# Patient Record
Sex: FEMALE | Race: BLACK OR AFRICAN AMERICAN | NOT HISPANIC OR LATINO
[De-identification: names, ages, dates, MRNs, and addresses within clinical notes are randomized per-mention and may not be internally consistent; named-entity substitution may affect disease eponyms.]

---

## 2023-03-06 PROBLEM — Z00.00 ENCOUNTER FOR PREVENTIVE HEALTH EXAMINATION: Status: ACTIVE | Noted: 2023-03-06

## 2023-03-13 ENCOUNTER — APPOINTMENT (OUTPATIENT)
Dept: OBGYN | Facility: CLINIC | Age: 33
End: 2023-03-13
Payer: MEDICAID

## 2023-03-13 ENCOUNTER — APPOINTMENT (OUTPATIENT)
Dept: ANTEPARTUM | Facility: CLINIC | Age: 33
End: 2023-03-13
Payer: MEDICAID

## 2023-03-13 ENCOUNTER — ASOB RESULT (OUTPATIENT)
Age: 33
End: 2023-03-13

## 2023-03-13 DIAGNOSIS — Z13.1 ENCOUNTER FOR SCREENING FOR DIABETES MELLITUS: ICD-10-CM

## 2023-03-13 DIAGNOSIS — Z13.29 ENCOUNTER FOR SCREENING FOR OTHER SUSPECTED ENDOCRINE DISORDER: ICD-10-CM

## 2023-03-13 DIAGNOSIS — Z11.59 ENCOUNTER FOR SCREENING FOR OTHER VIRAL DISEASES: ICD-10-CM

## 2023-03-13 DIAGNOSIS — Z11.3 ENCOUNTER FOR SCREENING FOR INFECTIONS WITH A PREDOMINANTLY SEXUAL MODE OF TRANSMISSION: ICD-10-CM

## 2023-03-13 DIAGNOSIS — Z34.90 ENCOUNTER FOR SUPERVISION OF NORMAL PREGNANCY, UNSPECIFIED, UNSPECIFIED TRIMESTER: ICD-10-CM

## 2023-03-13 DIAGNOSIS — Z13.71 ENCOUNTER FOR NONPROCREATIVE SCREENING FOR GENETIC DISEASE CARRIER STATUS: ICD-10-CM

## 2023-03-13 DIAGNOSIS — Z11.1 ENCOUNTER FOR SCREENING FOR RESPIRATORY TUBERCULOSIS: ICD-10-CM

## 2023-03-13 DIAGNOSIS — Z13.0 ENCOUNTER FOR SCREENING FOR DISEASES OF THE BLOOD AND BLOOD-FORMING ORGANS AND CERTAIN DISORDERS INVOLVING THE IMMUNE MECHANISM: ICD-10-CM

## 2023-03-13 DIAGNOSIS — Z83.3 FAMILY HISTORY OF DIABETES MELLITUS: ICD-10-CM

## 2023-03-13 LAB
BILIRUB UR QL STRIP: NORMAL
GLUCOSE UR-MCNC: NORMAL
HCG UR QL: 0.2 EU/DL
HGB UR QL STRIP.AUTO: NORMAL
KETONES UR-MCNC: NORMAL
LEUKOCYTE ESTERASE UR QL STRIP: NORMAL
NITRITE UR QL STRIP: NORMAL
PH UR STRIP: 7
PROT UR STRIP-MCNC: NORMAL
SP GR UR STRIP: 1.01

## 2023-03-13 PROCEDURE — 76815 OB US LIMITED FETUS(S): CPT

## 2023-03-13 PROCEDURE — 0500F INITIAL PRENATAL CARE VISIT: CPT

## 2023-03-13 RX ORDER — PSEUDOEPHEDRINE HCL 30 MG
27-0.8 TABLET ORAL DAILY
Qty: 60 | Refills: 2 | Status: ACTIVE | COMMUNITY
Start: 2023-03-13 | End: 1900-01-01

## 2023-03-15 LAB
ABO + RH PNL BLD: NORMAL
BACTERIA UR CULT: NORMAL
BASOPHILS # BLD AUTO: 0.05 K/UL
BASOPHILS NFR BLD AUTO: 0.6 %
BLD GP AB SCN SERPL QL: NORMAL
C TRACH RRNA SPEC QL NAA+PROBE: NOT DETECTED
EOSINOPHIL # BLD AUTO: 0.04 K/UL
EOSINOPHIL NFR BLD AUTO: 0.5 %
ESTIMATED AVERAGE GLUCOSE: 85 MG/DL
HBA1C MFR BLD HPLC: 4.6 %
HBV SURFACE AG SER QL: NONREACTIVE
HCT VFR BLD CALC: 31.5 %
HCV RNA FLD QL NAA+PROBE: NORMAL
HCV RNA SPEC QL PROBE+SIG AMP: NOT DETECTED
HGB BLD-MCNC: 10.1 G/DL
HIV1+2 AB SPEC QL IA.RAPID: NONREACTIVE
IMM GRANULOCYTES NFR BLD AUTO: 0.7 %
LYMPHOCYTES # BLD AUTO: 1.12 K/UL
LYMPHOCYTES NFR BLD AUTO: 13.3 %
MAN DIFF?: NORMAL
MCHC RBC-ENTMCNC: 27.4 PG
MCHC RBC-ENTMCNC: 32.1 GM/DL
MCV RBC AUTO: 85.6 FL
MEV IGG FLD QL IA: 98.2 AU/ML
MEV IGG+IGM SER-IMP: POSITIVE
MONOCYTES # BLD AUTO: 0.45 K/UL
MONOCYTES NFR BLD AUTO: 5.4 %
N GONORRHOEA RRNA SPEC QL NAA+PROBE: NOT DETECTED
NEUTROPHILS # BLD AUTO: 6.69 K/UL
NEUTROPHILS NFR BLD AUTO: 79.5 %
PLATELET # BLD AUTO: 302 K/UL
RBC # BLD: 3.68 M/UL
RBC # FLD: 13.1 %
RUBV IGG FLD-ACNC: 20.1 INDEX
RUBV IGG SER-IMP: POSITIVE
SOURCE AMPLIFICATION: NORMAL
T PALLIDUM AB SER QL IA: NEGATIVE
TSH SERPL-ACNC: 1.71 UIU/ML
VZV AB TITR SER: POSITIVE
VZV IGG SER IF-ACNC: 1054 INDEX
WBC # FLD AUTO: 8.41 K/UL

## 2023-03-17 ENCOUNTER — NON-APPOINTMENT (OUTPATIENT)
Age: 33
End: 2023-03-17

## 2023-03-17 LAB
AFP MOM: 0.83
AFP VALUE: 43.1 NG/ML
ALPHA FETOPROTEIN COMMENTS: NORMAL
ALPHA FETOPROTEIN INTERPRETATION: NORMAL
ALPHA FETOPROTEIN TETRA RESULTS: NORMAL
ALPHA FETOPROTEIN TETRA TEST RESULTS: NORMAL
DIA MOM: 0.72
DIA VALUE: 124.18 PG/ML
DSR (BY AGE) 1 IN: 360
DSR (SECOND TRIMESTER) 1 IN: 1063
FMR1 GENE MUT ANL BLD/T: NORMAL
GESTATIONAL AGE BASED ON: NORMAL
GESTATIONAL AGE ON COLLECTION DATE: 18.3 WEEKS
HCG MOM: 0.65
HCG VALUE: NORMAL MIU/ML
INSULIN DEP DIABETES: NO
MATERNAL AGE AT EDD AFP: 34.1 YR
MULTIPLE GESTATION: NORMAL
OSBR RISK 1 IN: NORMAL
RACE: NORMAL
T18 (BY AGE): NORMAL
T18 RISK: NORMAL
UE3 MOM: 0.49
UE3 VALUE: 0.79 NG/ML
WEIGHT AFP: 127 LBS

## 2023-03-20 ENCOUNTER — NON-APPOINTMENT (OUTPATIENT)
Age: 33
End: 2023-03-20

## 2023-03-21 LAB
AR GENE MUT ANL BLD/T: NORMAL
CFTR MUT TESTED BLD/T: NEGATIVE
HGB A MFR BLD: 59 %
HGB A2 MFR BLD: 3.3 %
HGB FRACT BLD-IMP: NORMAL
HGB S BLD QL SOLY: POSITIVE
HGB S MFR BLD: 37.7 %

## 2023-04-04 ENCOUNTER — APPOINTMENT (OUTPATIENT)
Dept: OBGYN | Facility: CLINIC | Age: 33
End: 2023-04-04
Payer: MEDICAID

## 2023-04-04 VITALS
BODY MASS INDEX: 21.92 KG/M2 | SYSTOLIC BLOOD PRESSURE: 112 MMHG | HEIGHT: 65 IN | DIASTOLIC BLOOD PRESSURE: 58 MMHG | WEIGHT: 131.6 LBS

## 2023-04-04 LAB
BILIRUB UR QL STRIP: NORMAL
GLUCOSE UR-MCNC: NORMAL
HCG UR QL: 0.2 EU/DL
HGB UR QL STRIP.AUTO: NORMAL
KETONES UR-MCNC: NORMAL
LEUKOCYTE ESTERASE UR QL STRIP: ABNORMAL
NITRITE UR QL STRIP: NORMAL
PH UR STRIP: 7
PROT UR STRIP-MCNC: NORMAL
SP GR UR STRIP: 1

## 2023-04-04 PROCEDURE — 0502F SUBSEQUENT PRENATAL CARE: CPT

## 2023-04-04 RX ORDER — DOCUSATE SODIUM 100 MG/1
100 CAPSULE ORAL TWICE DAILY
Qty: 60 | Refills: 2 | Status: ACTIVE | COMMUNITY
Start: 2023-03-15 | End: 1900-01-01

## 2023-04-05 ENCOUNTER — APPOINTMENT (OUTPATIENT)
Dept: ANTEPARTUM | Facility: CLINIC | Age: 33
End: 2023-04-05
Payer: MEDICAID

## 2023-04-05 ENCOUNTER — ASOB RESULT (OUTPATIENT)
Age: 33
End: 2023-04-05

## 2023-04-05 PROCEDURE — 76817 TRANSVAGINAL US OBSTETRIC: CPT

## 2023-04-05 PROCEDURE — 76805 OB US >/= 14 WKS SNGL FETUS: CPT

## 2023-04-07 ENCOUNTER — OUTPATIENT (OUTPATIENT)
Dept: OUTPATIENT SERVICES | Facility: HOSPITAL | Age: 33
LOS: 1 days | Discharge: HOME | End: 2023-04-07

## 2023-04-07 DIAGNOSIS — R77.9 ABNORMALITY OF PLASMA PROTEIN, UNSPECIFIED: ICD-10-CM

## 2023-04-11 ENCOUNTER — RESULT REVIEW (OUTPATIENT)
Age: 33
End: 2023-04-11

## 2023-04-11 ENCOUNTER — APPOINTMENT (OUTPATIENT)
Dept: HEMATOLOGY ONCOLOGY | Facility: CLINIC | Age: 33
End: 2023-04-11
Payer: MEDICAID

## 2023-04-11 VITALS
DIASTOLIC BLOOD PRESSURE: 65 MMHG | BODY MASS INDEX: 21.83 KG/M2 | SYSTOLIC BLOOD PRESSURE: 102 MMHG | HEART RATE: 98 BPM | HEIGHT: 65 IN | WEIGHT: 131 LBS | TEMPERATURE: 97 F | OXYGEN SATURATION: 100 %

## 2023-04-11 DIAGNOSIS — O99.019 ANEMIA COMPLICATING PREGNANCY, UNSPECIFIED TRIMESTER: ICD-10-CM

## 2023-04-11 LAB
BASOPHILS # BLD AUTO: 0.1 K/UL — SIGNIFICANT CHANGE UP (ref 0–0.2)
BASOPHILS NFR BLD AUTO: 1 % — SIGNIFICANT CHANGE UP (ref 0–2)
EOSINOPHIL # BLD AUTO: 0 K/UL — SIGNIFICANT CHANGE UP (ref 0–0.5)
EOSINOPHIL NFR BLD AUTO: 0.5 % — SIGNIFICANT CHANGE UP (ref 0–6)
HCT VFR BLD CALC: 31 % — LOW (ref 34.5–45)
HGB BLD-MCNC: 10.2 G/DL — LOW (ref 11.5–15.5)
LYMPHOCYTES # BLD AUTO: 1 K/UL — SIGNIFICANT CHANGE UP (ref 1–3.3)
LYMPHOCYTES # BLD AUTO: 10.7 % — LOW (ref 13–44)
MCHC RBC-ENTMCNC: 28.5 PG — SIGNIFICANT CHANGE UP (ref 27–34)
MCHC RBC-ENTMCNC: 32.8 G/DL — SIGNIFICANT CHANGE UP (ref 32–36)
MCV RBC AUTO: 86.8 FL — SIGNIFICANT CHANGE UP (ref 80–100)
MONOCYTES # BLD AUTO: 0.5 K/UL — SIGNIFICANT CHANGE UP (ref 0–0.9)
MONOCYTES NFR BLD AUTO: 5.7 % — SIGNIFICANT CHANGE UP (ref 2–14)
NEUTROPHILS # BLD AUTO: 7.4 K/UL — SIGNIFICANT CHANGE UP (ref 1.8–7.4)
NEUTROPHILS NFR BLD AUTO: 82.1 % — HIGH (ref 43–77)
PLATELET # BLD AUTO: 272 K/UL — SIGNIFICANT CHANGE UP (ref 150–400)
RBC # BLD: 3.57 M/UL — LOW (ref 3.8–5.2)
RBC # FLD: 11.8 % — SIGNIFICANT CHANGE UP (ref 10.3–14.5)
WBC # BLD: 9 K/UL — SIGNIFICANT CHANGE UP (ref 3.8–10.5)
WBC # FLD AUTO: 9 K/UL — SIGNIFICANT CHANGE UP (ref 3.8–10.5)

## 2023-04-11 PROCEDURE — 99204 OFFICE O/P NEW MOD 45 MIN: CPT

## 2023-04-19 NOTE — ASSESSMENT
[FreeTextEntry1] : 32 year old F with no significant PMHX presents for initial evaluation for sickle cell anemia trait referred by GYN\par \par #Sickle cell anemia trait \par Patient with mild anemia HGB at 10.1, remains asymptomatic. Hemoglobin electrophoresis: A2 < 5. S of ~30%. \par Advised  to undergo genetic testing. \par No specific interventions at this time. The baby will require sickle cell testing at the time of birth \par Will send for labs today\par Continue f/u with [facility]

## 2023-04-19 NOTE — ADDENDUM
[FreeTextEntry1] : Documented by Vivi Addison acting as scribe for Dr. Ybarra on 04/11/2023 \par \par All Medical record entries made by the Scribe were at my, Dr. Ybarra's, direction and personally dictated by me on 04/11/2023 . I have reviewed the chart and agree that the record accurately reflects my personal performance of the history, physical exam, assessment and plan. I have also personally directed, reviewed, and agreed with the discharge instructions.\par \par

## 2023-04-19 NOTE — HISTORY OF PRESENT ILLNESS
[de-identified] : 32 year old F with no significant PMHX presents for initial evaluation for sickle cell anemia trait referred by GYN\par \par \par Tonia garibay  # [id number]\par Patient presents for an initial consultation with \par She is pregnant EDC 8/12/23\par Denies fmhx of sickle cell anemia, never had a blood transfusion in the past\par Patient doing well overall offers no complaints\par Denies feeling light headed, dizziness, SOB, LE swelling

## 2023-05-02 ENCOUNTER — APPOINTMENT (OUTPATIENT)
Dept: OBGYN | Facility: CLINIC | Age: 33
End: 2023-05-02
Payer: MEDICAID

## 2023-05-02 VITALS
DIASTOLIC BLOOD PRESSURE: 72 MMHG | HEIGHT: 65 IN | BODY MASS INDEX: 22.26 KG/M2 | SYSTOLIC BLOOD PRESSURE: 124 MMHG | WEIGHT: 133.6 LBS

## 2023-05-02 DIAGNOSIS — Z34.92 ENCOUNTER FOR SUPERVISION OF NORMAL PREGNANCY, UNSPECIFIED, SECOND TRIMESTER: ICD-10-CM

## 2023-05-02 LAB
BILIRUB UR QL STRIP: NORMAL
CLARITY UR: CLEAR
COLLECTION METHOD: NORMAL
GLUCOSE UR-MCNC: NORMAL
HCG UR QL: 0.2 EU/DL
HGB UR QL STRIP.AUTO: NORMAL
KETONES UR-MCNC: NORMAL
LEUKOCYTE ESTERASE UR QL STRIP: NORMAL
NITRITE UR QL STRIP: NORMAL
PH UR STRIP: 7
PROT UR STRIP-MCNC: NORMAL
SP GR UR STRIP: 1.01

## 2023-05-02 PROCEDURE — 0502F SUBSEQUENT PRENATAL CARE: CPT

## 2023-05-03 ENCOUNTER — NON-APPOINTMENT (OUTPATIENT)
Age: 33
End: 2023-05-03

## 2023-05-17 LAB
BASOPHILS # BLD AUTO: 0.05 K/UL
BASOPHILS NFR BLD AUTO: 0.5 %
EOSINOPHIL # BLD AUTO: 0.08 K/UL
EOSINOPHIL NFR BLD AUTO: 0.9 %
GLUCOSE 1H P 50 G GLC PO SERPL-MCNC: 96 MG/DL
HCT VFR BLD CALC: 33.3 %
HGB BLD-MCNC: 10.8 G/DL
IMM GRANULOCYTES NFR BLD AUTO: 1.9 %
LYMPHOCYTES # BLD AUTO: 1.12 K/UL
LYMPHOCYTES NFR BLD AUTO: 12.2 %
M TB IFN-G BLD-IMP: NEGATIVE
MAN DIFF?: NORMAL
MCHC RBC-ENTMCNC: 28.5 PG
MCHC RBC-ENTMCNC: 32.4 GM/DL
MCV RBC AUTO: 87.9 FL
MONOCYTES # BLD AUTO: 0.56 K/UL
MONOCYTES NFR BLD AUTO: 6.1 %
NEUTROPHILS # BLD AUTO: 7.18 K/UL
NEUTROPHILS NFR BLD AUTO: 78.4 %
PLATELET # BLD AUTO: 260 K/UL
QUANTIFERON TB PLUS MITOGEN MINUS NIL: 4.47 IU/ML
QUANTIFERON TB PLUS NIL: 0.02 IU/ML
QUANTIFERON TB PLUS TB1 MINUS NIL: 0.01 IU/ML
QUANTIFERON TB PLUS TB2 MINUS NIL: 0.01 IU/ML
RBC # BLD: 3.79 M/UL
RBC # FLD: 13.5 %
WBC # FLD AUTO: 9.16 K/UL

## 2023-05-20 ENCOUNTER — NON-APPOINTMENT (OUTPATIENT)
Age: 33
End: 2023-05-20

## 2023-05-23 ENCOUNTER — APPOINTMENT (OUTPATIENT)
Dept: OBGYN | Facility: CLINIC | Age: 33
End: 2023-05-23
Payer: MEDICAID

## 2023-05-23 VITALS
BODY MASS INDEX: 23.32 KG/M2 | WEIGHT: 140 LBS | DIASTOLIC BLOOD PRESSURE: 70 MMHG | SYSTOLIC BLOOD PRESSURE: 116 MMHG | HEIGHT: 65 IN

## 2023-05-23 DIAGNOSIS — D57.3 SICKLE-CELL TRAIT: ICD-10-CM

## 2023-05-23 PROCEDURE — 0502F SUBSEQUENT PRENATAL CARE: CPT

## 2023-05-25 LAB
BACTERIA UR CULT: NORMAL
BILIRUB UR QL STRIP: NORMAL
GLUCOSE UR-MCNC: NORMAL
HCG UR QL: 0.2 EU/DL
HGB UR QL STRIP.AUTO: NORMAL
KETONES UR-MCNC: NORMAL
LEUKOCYTE ESTERASE UR QL STRIP: NORMAL
NITRITE UR QL STRIP: NORMAL
PH UR STRIP: 8
PROT UR STRIP-MCNC: ABNORMAL
SP GR UR STRIP: 1.01

## 2023-06-06 ENCOUNTER — NON-APPOINTMENT (OUTPATIENT)
Age: 33
End: 2023-06-06

## 2023-06-06 ENCOUNTER — APPOINTMENT (OUTPATIENT)
Dept: OBGYN | Facility: CLINIC | Age: 33
End: 2023-06-06
Payer: MEDICAID

## 2023-06-06 VITALS
DIASTOLIC BLOOD PRESSURE: 72 MMHG | WEIGHT: 142 LBS | SYSTOLIC BLOOD PRESSURE: 102 MMHG | BODY MASS INDEX: 23.66 KG/M2 | HEIGHT: 65 IN

## 2023-06-06 LAB
BILIRUB UR QL STRIP: NORMAL
GLUCOSE UR-MCNC: NORMAL
HCG UR QL: 0.2 EU/DL
HGB UR QL STRIP.AUTO: NORMAL
KETONES UR-MCNC: NORMAL
LEUKOCYTE ESTERASE UR QL STRIP: ABNORMAL
NITRITE UR QL STRIP: NORMAL
PH UR STRIP: 6
PROT UR STRIP-MCNC: NORMAL
SP GR UR STRIP: 1.01

## 2023-06-06 PROCEDURE — 0502F SUBSEQUENT PRENATAL CARE: CPT

## 2023-06-07 ENCOUNTER — ASOB RESULT (OUTPATIENT)
Age: 33
End: 2023-06-07

## 2023-06-07 ENCOUNTER — APPOINTMENT (OUTPATIENT)
Dept: ANTEPARTUM | Facility: CLINIC | Age: 33
End: 2023-06-07
Payer: MEDICAID

## 2023-06-07 PROCEDURE — 76816 OB US FOLLOW-UP PER FETUS: CPT

## 2023-06-07 PROCEDURE — 76820 UMBILICAL ARTERY ECHO: CPT | Mod: 59

## 2023-06-08 ENCOUNTER — TRANSCRIPTION ENCOUNTER (OUTPATIENT)
Age: 33
End: 2023-06-08

## 2023-06-20 ENCOUNTER — APPOINTMENT (OUTPATIENT)
Dept: OBGYN | Facility: CLINIC | Age: 33
End: 2023-06-20
Payer: MEDICAID

## 2023-06-20 VITALS
HEIGHT: 65 IN | DIASTOLIC BLOOD PRESSURE: 72 MMHG | SYSTOLIC BLOOD PRESSURE: 118 MMHG | WEIGHT: 144 LBS | BODY MASS INDEX: 23.99 KG/M2

## 2023-06-20 DIAGNOSIS — Z23 ENCOUNTER FOR IMMUNIZATION: ICD-10-CM

## 2023-06-20 DIAGNOSIS — O36.5990 MATERNAL CARE FOR OTHER KNOWN OR SUSPECTED POOR FETAL GROWTH, UNSPECIFIED TRIMESTER, NOT APPLICABLE OR UNSPECIFIED: ICD-10-CM

## 2023-06-20 LAB
BILIRUB UR QL STRIP: NORMAL
GLUCOSE UR-MCNC: NORMAL
HCG UR QL: 1 EU/DL
HGB UR QL STRIP.AUTO: NORMAL
KETONES UR-MCNC: NORMAL
LEUKOCYTE ESTERASE UR QL STRIP: ABNORMAL
NITRITE UR QL STRIP: NORMAL
PH UR STRIP: 7
PROT UR STRIP-MCNC: NORMAL
SP GR UR STRIP: 1.01

## 2023-06-20 PROCEDURE — 90715 TDAP VACCINE 7 YRS/> IM: CPT

## 2023-06-20 PROCEDURE — 90471 IMMUNIZATION ADMIN: CPT

## 2023-06-20 PROCEDURE — 0502F SUBSEQUENT PRENATAL CARE: CPT

## 2023-06-22 ENCOUNTER — APPOINTMENT (OUTPATIENT)
Dept: ANTEPARTUM | Facility: CLINIC | Age: 33
End: 2023-06-22
Payer: MEDICAID

## 2023-06-22 ENCOUNTER — ASOB RESULT (OUTPATIENT)
Age: 33
End: 2023-06-22

## 2023-06-22 PROCEDURE — 76819 FETAL BIOPHYS PROFIL W/O NST: CPT

## 2023-06-29 ENCOUNTER — ASOB RESULT (OUTPATIENT)
Age: 33
End: 2023-06-29

## 2023-06-29 ENCOUNTER — APPOINTMENT (OUTPATIENT)
Dept: ANTEPARTUM | Facility: CLINIC | Age: 33
End: 2023-06-29
Payer: MEDICAID

## 2023-06-29 PROCEDURE — 76821 MIDDLE CEREBRAL ARTERY ECHO: CPT

## 2023-06-29 PROCEDURE — 76820 UMBILICAL ARTERY ECHO: CPT

## 2023-06-29 PROCEDURE — 76818 FETAL BIOPHYS PROFILE W/NST: CPT

## 2023-06-29 PROCEDURE — 93976 VASCULAR STUDY: CPT

## 2023-07-06 ENCOUNTER — APPOINTMENT (OUTPATIENT)
Dept: OBGYN | Facility: CLINIC | Age: 33
End: 2023-07-06
Payer: MEDICAID

## 2023-07-06 ENCOUNTER — ASOB RESULT (OUTPATIENT)
Age: 33
End: 2023-07-06

## 2023-07-06 ENCOUNTER — APPOINTMENT (OUTPATIENT)
Dept: ANTEPARTUM | Facility: CLINIC | Age: 33
End: 2023-07-06
Payer: MEDICAID

## 2023-07-06 VITALS
SYSTOLIC BLOOD PRESSURE: 112 MMHG | BODY MASS INDEX: 24.32 KG/M2 | WEIGHT: 146 LBS | HEIGHT: 65 IN | DIASTOLIC BLOOD PRESSURE: 64 MMHG

## 2023-07-06 LAB
BILIRUB UR QL STRIP: NORMAL
GLUCOSE UR-MCNC: NORMAL
HCG UR QL: 0.2 EU/DL
HGB UR QL STRIP.AUTO: NORMAL
KETONES UR-MCNC: NORMAL
LEUKOCYTE ESTERASE UR QL STRIP: NORMAL
NITRITE UR QL STRIP: NORMAL
PH UR STRIP: 6
PROT UR STRIP-MCNC: NORMAL
SP GR UR STRIP: 1.01

## 2023-07-06 PROCEDURE — 76816 OB US FOLLOW-UP PER FETUS: CPT

## 2023-07-06 PROCEDURE — 76818 FETAL BIOPHYS PROFILE W/NST: CPT

## 2023-07-06 PROCEDURE — 0502F SUBSEQUENT PRENATAL CARE: CPT

## 2023-07-06 PROCEDURE — 76820 UMBILICAL ARTERY ECHO: CPT | Mod: 59

## 2023-07-13 ENCOUNTER — APPOINTMENT (OUTPATIENT)
Dept: ANTEPARTUM | Facility: CLINIC | Age: 33
End: 2023-07-13
Payer: MEDICAID

## 2023-07-13 ENCOUNTER — ASOB RESULT (OUTPATIENT)
Age: 33
End: 2023-07-13

## 2023-07-13 PROCEDURE — ZZZZZ: CPT

## 2023-07-13 PROCEDURE — 76818 FETAL BIOPHYS PROFILE W/NST: CPT

## 2023-07-13 PROCEDURE — 76820 UMBILICAL ARTERY ECHO: CPT

## 2023-07-17 ENCOUNTER — ASOB RESULT (OUTPATIENT)
Age: 33
End: 2023-07-17

## 2023-07-17 ENCOUNTER — APPOINTMENT (OUTPATIENT)
Dept: ANTEPARTUM | Facility: CLINIC | Age: 33
End: 2023-07-17
Payer: MEDICAID

## 2023-07-17 PROCEDURE — 76818 FETAL BIOPHYS PROFILE W/NST: CPT

## 2023-07-17 PROCEDURE — 76820 UMBILICAL ARTERY ECHO: CPT

## 2023-07-20 ENCOUNTER — APPOINTMENT (OUTPATIENT)
Dept: ANTEPARTUM | Facility: CLINIC | Age: 33
End: 2023-07-20
Payer: MEDICAID

## 2023-07-20 ENCOUNTER — ASOB RESULT (OUTPATIENT)
Age: 33
End: 2023-07-20

## 2023-07-20 ENCOUNTER — APPOINTMENT (OUTPATIENT)
Dept: OBGYN | Facility: CLINIC | Age: 33
End: 2023-07-20
Payer: MEDICAID

## 2023-07-20 VITALS
SYSTOLIC BLOOD PRESSURE: 116 MMHG | HEIGHT: 65 IN | WEIGHT: 147 LBS | BODY MASS INDEX: 24.49 KG/M2 | DIASTOLIC BLOOD PRESSURE: 64 MMHG

## 2023-07-20 DIAGNOSIS — Z34.93 ENCOUNTER FOR SUPERVISION OF NORMAL PREGNANCY, UNSPECIFIED, THIRD TRIMESTER: ICD-10-CM

## 2023-07-20 LAB
BILIRUB UR QL STRIP: NORMAL
GLUCOSE UR-MCNC: NORMAL
HCG UR QL: 0.2 EU/DL
HGB UR QL STRIP.AUTO: ABNORMAL
KETONES UR-MCNC: NORMAL
LEUKOCYTE ESTERASE UR QL STRIP: ABNORMAL
NITRITE UR QL STRIP: NORMAL
PH UR STRIP: 0.5
PROT UR STRIP-MCNC: NORMAL
SP GR UR STRIP: 1.01

## 2023-07-20 PROCEDURE — 93976 VASCULAR STUDY: CPT

## 2023-07-20 PROCEDURE — 76820 UMBILICAL ARTERY ECHO: CPT

## 2023-07-20 PROCEDURE — 76818 FETAL BIOPHYS PROFILE W/NST: CPT

## 2023-07-20 PROCEDURE — 0502F SUBSEQUENT PRENATAL CARE: CPT

## 2023-07-24 ENCOUNTER — APPOINTMENT (OUTPATIENT)
Dept: ANTEPARTUM | Facility: CLINIC | Age: 33
End: 2023-07-24
Payer: MEDICAID

## 2023-07-24 ENCOUNTER — NON-APPOINTMENT (OUTPATIENT)
Age: 33
End: 2023-07-24

## 2023-07-24 ENCOUNTER — ASOB RESULT (OUTPATIENT)
Age: 33
End: 2023-07-24

## 2023-07-24 ENCOUNTER — INPATIENT (INPATIENT)
Facility: HOSPITAL | Age: 33
LOS: 3 days | Discharge: HOME | End: 2023-07-28
Attending: STUDENT IN AN ORGANIZED HEALTH CARE EDUCATION/TRAINING PROGRAM | Admitting: STUDENT IN AN ORGANIZED HEALTH CARE EDUCATION/TRAINING PROGRAM
Payer: MEDICAID

## 2023-07-24 ENCOUNTER — TRANSCRIPTION ENCOUNTER (OUTPATIENT)
Age: 33
End: 2023-07-24

## 2023-07-24 DIAGNOSIS — O26.899 OTHER SPECIFIED PREGNANCY RELATED CONDITIONS, UNSPECIFIED TRIMESTER: ICD-10-CM

## 2023-07-24 DIAGNOSIS — O26.893 OTHER SPECIFIED PREGNANCY RELATED CONDITIONS, THIRD TRIMESTER: ICD-10-CM

## 2023-07-24 PROCEDURE — 76820 UMBILICAL ARTERY ECHO: CPT

## 2023-07-24 PROCEDURE — 76818 FETAL BIOPHYS PROFILE W/NST: CPT

## 2023-07-24 RX ORDER — SODIUM CHLORIDE 9 MG/ML
1000 INJECTION, SOLUTION INTRAVENOUS
Refills: 0 | Status: DISCONTINUED | OUTPATIENT
Start: 2023-07-24 | End: 2023-07-24

## 2023-07-24 RX ORDER — OXYTOCIN 10 UNIT/ML
333.33 VIAL (ML) INJECTION
Qty: 20 | Refills: 0 | Status: DISCONTINUED | OUTPATIENT
Start: 2023-07-24 | End: 2023-07-24

## 2023-07-24 RX ORDER — CITRIC ACID/SODIUM CITRATE 300-500 MG
30 SOLUTION, ORAL ORAL ONCE
Refills: 0 | Status: DISCONTINUED | OUTPATIENT
Start: 2023-07-24 | End: 2023-07-24

## 2023-07-24 RX ORDER — CHLORHEXIDINE GLUCONATE 213 G/1000ML
1 SOLUTION TOPICAL DAILY
Refills: 0 | Status: DISCONTINUED | OUTPATIENT
Start: 2023-07-24 | End: 2023-07-24

## 2023-07-25 ENCOUNTER — RESULT REVIEW (OUTPATIENT)
Age: 33
End: 2023-07-25

## 2023-07-25 ENCOUNTER — TRANSCRIPTION ENCOUNTER (OUTPATIENT)
Age: 33
End: 2023-07-25

## 2023-07-25 VITALS
DIASTOLIC BLOOD PRESSURE: 76 MMHG | HEART RATE: 76 BPM | SYSTOLIC BLOOD PRESSURE: 188 MMHG | RESPIRATION RATE: 18 BRPM | TEMPERATURE: 98 F | WEIGHT: 143.96 LBS | HEIGHT: 66 IN

## 2023-07-25 LAB
ABO RH CONFIRMATION: SIGNIFICANT CHANGE UP
B-HEM STREP SPEC QL CULT: NORMAL
BASOPHILS # BLD AUTO: 0.12 K/UL — SIGNIFICANT CHANGE UP (ref 0–0.2)
BASOPHILS NFR BLD AUTO: 0.9 % — SIGNIFICANT CHANGE UP (ref 0–2)
BLD GP AB SCN SERPL QL: SIGNIFICANT CHANGE UP
EOSINOPHIL # BLD AUTO: 0.12 K/UL — SIGNIFICANT CHANGE UP (ref 0–0.5)
EOSINOPHIL NFR BLD AUTO: 0.9 % — SIGNIFICANT CHANGE UP (ref 0–6)
GIANT PLATELETS BLD QL SMEAR: PRESENT — SIGNIFICANT CHANGE UP
HCT VFR BLD CALC: 32 % — LOW (ref 34.5–45)
HGB BLD-MCNC: 10.9 G/DL — LOW (ref 11.5–15.5)
HIV1+2 AB SPEC QL IA.RAPID: NONREACTIVE
LYMPHOCYTES # BLD AUTO: 1.14 K/UL — SIGNIFICANT CHANGE UP (ref 1–3.3)
LYMPHOCYTES # BLD AUTO: 8.8 % — LOW (ref 13–44)
MANUAL SMEAR VERIFICATION: SIGNIFICANT CHANGE UP
MCHC RBC-ENTMCNC: 28.2 PG — SIGNIFICANT CHANGE UP (ref 27–34)
MCHC RBC-ENTMCNC: 34.1 GM/DL — SIGNIFICANT CHANGE UP (ref 32–36)
MCV RBC AUTO: 82.9 FL — SIGNIFICANT CHANGE UP (ref 80–100)
MONOCYTES # BLD AUTO: 0.91 K/UL — HIGH (ref 0–0.9)
MONOCYTES NFR BLD AUTO: 7 % — SIGNIFICANT CHANGE UP (ref 2–14)
NEUTROPHILS # BLD AUTO: 10.69 K/UL — HIGH (ref 1.8–7.4)
NEUTROPHILS NFR BLD AUTO: 78.9 % — HIGH (ref 43–77)
NEUTS BAND # BLD: 3.5 % — SIGNIFICANT CHANGE UP (ref 0–8)
PLAT MORPH BLD: NORMAL — SIGNIFICANT CHANGE UP
PLATELET # BLD AUTO: 326 K/UL — SIGNIFICANT CHANGE UP (ref 150–400)
POLYCHROMASIA BLD QL SMEAR: SIGNIFICANT CHANGE UP
RBC # BLD: 3.86 M/UL — SIGNIFICANT CHANGE UP (ref 3.8–5.2)
RBC # FLD: 13.1 % — SIGNIFICANT CHANGE UP (ref 10.3–14.5)
RBC BLD AUTO: NORMAL — SIGNIFICANT CHANGE UP
SMUDGE CELLS # BLD: PRESENT — SIGNIFICANT CHANGE UP
T PALLIDUM AB SER QL IA: NEGATIVE
T PALLIDUM AB TITR SER: NEGATIVE — SIGNIFICANT CHANGE UP
WBC # BLD: 12.97 K/UL — HIGH (ref 3.8–10.5)
WBC # FLD AUTO: 12.97 K/UL — HIGH (ref 3.8–10.5)

## 2023-07-25 PROCEDURE — 59510 CESAREAN DELIVERY: CPT | Mod: U7

## 2023-07-25 PROCEDURE — 88307 TISSUE EXAM BY PATHOLOGIST: CPT | Mod: 26

## 2023-07-25 DEVICE — SURGICEL FIBRILLAR 2 X 4": Type: IMPLANTABLE DEVICE | Status: FUNCTIONAL

## 2023-07-25 RX ORDER — CEFAZOLIN SODIUM 1 G
2000 VIAL (EA) INJECTION ONCE
Refills: 0 | Status: COMPLETED | OUTPATIENT
Start: 2023-07-25 | End: 2023-07-25

## 2023-07-25 RX ORDER — OXYCODONE HYDROCHLORIDE 5 MG/1
5 TABLET ORAL
Refills: 0 | Status: DISCONTINUED | OUTPATIENT
Start: 2023-07-25 | End: 2023-07-25

## 2023-07-25 RX ORDER — ENOXAPARIN SODIUM 100 MG/ML
40 INJECTION SUBCUTANEOUS EVERY 24 HOURS
Refills: 0 | Status: DISCONTINUED | OUTPATIENT
Start: 2023-07-25 | End: 2023-07-28

## 2023-07-25 RX ORDER — SCOPALAMINE 1 MG/3D
1 PATCH, EXTENDED RELEASE TRANSDERMAL ONCE
Refills: 0 | Status: COMPLETED | OUTPATIENT
Start: 2023-07-25 | End: 2023-07-25

## 2023-07-25 RX ORDER — DEXAMETHASONE 0.5 MG/5ML
4 ELIXIR ORAL EVERY 6 HOURS
Refills: 0 | Status: DISCONTINUED | OUTPATIENT
Start: 2023-07-25 | End: 2023-07-25

## 2023-07-25 RX ORDER — OXYTOCIN 10 UNIT/ML
333.33 VIAL (ML) INJECTION
Qty: 20 | Refills: 0 | Status: DISCONTINUED | OUTPATIENT
Start: 2023-07-25 | End: 2023-07-28

## 2023-07-25 RX ORDER — OXYCODONE HYDROCHLORIDE 5 MG/1
5 TABLET ORAL ONCE
Refills: 0 | Status: DISCONTINUED | OUTPATIENT
Start: 2023-07-25 | End: 2023-07-28

## 2023-07-25 RX ORDER — OXYCODONE HYDROCHLORIDE 5 MG/1
10 TABLET ORAL
Refills: 0 | Status: DISCONTINUED | OUTPATIENT
Start: 2023-07-25 | End: 2023-07-25

## 2023-07-25 RX ORDER — DIPHENHYDRAMINE HCL 50 MG
25 CAPSULE ORAL EVERY 6 HOURS
Refills: 0 | Status: DISCONTINUED | OUTPATIENT
Start: 2023-07-25 | End: 2023-07-28

## 2023-07-25 RX ORDER — DEXAMETHASONE 0.5 MG/5ML
4 ELIXIR ORAL EVERY 6 HOURS
Refills: 0 | Status: DISCONTINUED | OUTPATIENT
Start: 2023-07-25 | End: 2023-07-28

## 2023-07-25 RX ORDER — ACETAMINOPHEN 500 MG
975 TABLET ORAL ONCE
Refills: 0 | Status: COMPLETED | OUTPATIENT
Start: 2023-07-25 | End: 2023-07-25

## 2023-07-25 RX ORDER — CEFAZOLIN SODIUM 1 G
2000 VIAL (EA) INJECTION ONCE
Refills: 0 | Status: DISCONTINUED | OUTPATIENT
Start: 2023-07-25 | End: 2023-07-25

## 2023-07-25 RX ORDER — NALOXONE HYDROCHLORIDE 4 MG/.1ML
0.1 SPRAY NASAL
Refills: 0 | Status: DISCONTINUED | OUTPATIENT
Start: 2023-07-25 | End: 2023-07-28

## 2023-07-25 RX ORDER — KETOROLAC TROMETHAMINE 30 MG/ML
30 SYRINGE (ML) INJECTION EVERY 6 HOURS
Refills: 0 | Status: DISCONTINUED | OUTPATIENT
Start: 2023-07-25 | End: 2023-07-26

## 2023-07-25 RX ORDER — FAMOTIDINE 10 MG/ML
20 INJECTION INTRAVENOUS ONCE
Refills: 0 | Status: COMPLETED | OUTPATIENT
Start: 2023-07-25 | End: 2023-07-25

## 2023-07-25 RX ORDER — SODIUM CHLORIDE 9 MG/ML
1000 INJECTION, SOLUTION INTRAVENOUS
Refills: 0 | Status: DISCONTINUED | OUTPATIENT
Start: 2023-07-25 | End: 2023-07-28

## 2023-07-25 RX ORDER — LANOLIN
1 OINTMENT (GRAM) TOPICAL EVERY 6 HOURS
Refills: 0 | Status: DISCONTINUED | OUTPATIENT
Start: 2023-07-25 | End: 2023-07-28

## 2023-07-25 RX ORDER — MAGNESIUM HYDROXIDE 400 MG/1
30 TABLET, CHEWABLE ORAL
Refills: 0 | Status: DISCONTINUED | OUTPATIENT
Start: 2023-07-25 | End: 2023-07-28

## 2023-07-25 RX ORDER — CITRIC ACID/SODIUM CITRATE 300-500 MG
30 SOLUTION, ORAL ORAL ONCE
Refills: 0 | Status: COMPLETED | OUTPATIENT
Start: 2023-07-25 | End: 2023-07-25

## 2023-07-25 RX ORDER — TETANUS TOXOID, REDUCED DIPHTHERIA TOXOID AND ACELLULAR PERTUSSIS VACCINE, ADSORBED 5; 2.5; 8; 8; 2.5 [IU]/.5ML; [IU]/.5ML; UG/.5ML; UG/.5ML; UG/.5ML
0.5 SUSPENSION INTRAMUSCULAR ONCE
Refills: 0 | Status: DISCONTINUED | OUTPATIENT
Start: 2023-07-25 | End: 2023-07-28

## 2023-07-25 RX ORDER — IBUPROFEN 200 MG
600 TABLET ORAL EVERY 6 HOURS
Refills: 0 | Status: COMPLETED | OUTPATIENT
Start: 2023-07-25 | End: 2024-06-22

## 2023-07-25 RX ORDER — ONDANSETRON 8 MG/1
4 TABLET, FILM COATED ORAL EVERY 6 HOURS
Refills: 0 | Status: DISCONTINUED | OUTPATIENT
Start: 2023-07-25 | End: 2023-07-28

## 2023-07-25 RX ORDER — CHLORHEXIDINE GLUCONATE 213 G/1000ML
1 SOLUTION TOPICAL DAILY
Refills: 0 | Status: DISCONTINUED | OUTPATIENT
Start: 2023-07-25 | End: 2023-07-25

## 2023-07-25 RX ORDER — NALOXONE HYDROCHLORIDE 4 MG/.1ML
0.1 SPRAY NASAL
Refills: 0 | Status: DISCONTINUED | OUTPATIENT
Start: 2023-07-25 | End: 2023-07-25

## 2023-07-25 RX ORDER — SIMETHICONE 80 MG/1
80 TABLET, CHEWABLE ORAL EVERY 4 HOURS
Refills: 0 | Status: DISCONTINUED | OUTPATIENT
Start: 2023-07-25 | End: 2023-07-28

## 2023-07-25 RX ORDER — SCOPALAMINE 1 MG/3D
1 PATCH, EXTENDED RELEASE TRANSDERMAL ONCE
Refills: 0 | Status: DISCONTINUED | OUTPATIENT
Start: 2023-07-25 | End: 2023-07-25

## 2023-07-25 RX ORDER — ACETAMINOPHEN 500 MG
975 TABLET ORAL
Refills: 0 | Status: DISCONTINUED | OUTPATIENT
Start: 2023-07-25 | End: 2023-07-28

## 2023-07-25 RX ORDER — OXYCODONE HYDROCHLORIDE 5 MG/1
5 TABLET ORAL
Refills: 0 | Status: DISCONTINUED | OUTPATIENT
Start: 2023-07-25 | End: 2023-07-28

## 2023-07-25 RX ORDER — ONDANSETRON 8 MG/1
4 TABLET, FILM COATED ORAL EVERY 6 HOURS
Refills: 0 | Status: DISCONTINUED | OUTPATIENT
Start: 2023-07-25 | End: 2023-07-25

## 2023-07-25 RX ORDER — SODIUM CHLORIDE 9 MG/ML
1000 INJECTION, SOLUTION INTRAVENOUS
Refills: 0 | Status: DISCONTINUED | OUTPATIENT
Start: 2023-07-25 | End: 2023-07-25

## 2023-07-25 RX ADMIN — Medication 30 MILLIGRAM(S): at 23:21

## 2023-07-25 RX ADMIN — SODIUM CHLORIDE 125 MILLILITER(S): 9 INJECTION, SOLUTION INTRAVENOUS at 19:14

## 2023-07-25 RX ADMIN — Medication 975 MILLIGRAM(S): at 21:19

## 2023-07-25 RX ADMIN — Medication 975 MILLIGRAM(S): at 15:00

## 2023-07-25 RX ADMIN — Medication 4 MILLIGRAM(S): at 21:59

## 2023-07-25 RX ADMIN — SCOPALAMINE 1 PATCH: 1 PATCH, EXTENDED RELEASE TRANSDERMAL at 14:30

## 2023-07-25 RX ADMIN — Medication 0.2 MILLIGRAM(S): at 16:06

## 2023-07-25 RX ADMIN — Medication 975 MILLIGRAM(S): at 14:30

## 2023-07-25 RX ADMIN — FAMOTIDINE 20 MILLIGRAM(S): 10 INJECTION INTRAVENOUS at 14:30

## 2023-07-25 RX ADMIN — Medication 2000 MILLIGRAM(S): at 15:25

## 2023-07-25 RX ADMIN — ONDANSETRON 4 MILLIGRAM(S): 8 TABLET, FILM COATED ORAL at 18:02

## 2023-07-25 RX ADMIN — Medication 1000 MILLIUNIT(S)/MIN: at 15:54

## 2023-07-25 RX ADMIN — Medication 30 MILLILITER(S): at 14:30

## 2023-07-25 NOTE — OB RN DELIVERY SUMMARY - NS_SEPSISRSKCALC_OBGYN_ALL_OB_FT
EOS calculated successfully. EOS Risk Factor: 0.5/1000 live births ([facility] national incidence); GA=37w3d; Temp=98.42; ROM=0.017; GBS='Negative'; Antibiotics='No antibiotics or any antibiotics < 2 hrs prior to birth'

## 2023-07-25 NOTE — DISCHARGE NOTE OB - DRINK 8 TO 10 GLASSES OF FLUID EACH DAY
Statement Selected Benzoyl Peroxide Pregnancy And Lactation Text: This medication is Pregnancy Category C. It is unknown if benzoyl peroxide is excreted in breast milk.

## 2023-07-25 NOTE — OB RN DELIVERY SUMMARY - NSSELHIDDEN_OBGYN_ALL_OB_FT
[NS_DeliveryAttending1_OBGYN_ALL_OB_FT:[id number]=],[NS_DeliveryAssist1_OBGYN_ALL_OB_FT:[id number]=],[NS_DeliveryRN_OBGYN_ALL_OB_FT:[id number]==]

## 2023-07-25 NOTE — DISCHARGE NOTE OB - CARE PLAN
Principal Discharge DX:	Single delivery by  section  Assessment and plan of treatment:	Please call your provider in 1 week for wound check. Take medications as directed, regular diet, activity as tolerated. Exclusive breast feeding for the first 6 months is recommended. Nothing per vagina for 6 weeks (incl. sex, douching, etc). If you have additional concerns, please inform your provider.   1 Principal Discharge DX:	Single delivery by  section  Assessment and plan of treatment:	Please call your provider in 1 week for wound check. Take medications as directed, regular diet, activity as tolerated. Exclusive breast feeding for the first 6 months is recommended. Nothing per vagina for 6 weeks (incl. sex, douching, etc). If you have additional concerns, please inform your provider.  Secondary Diagnosis:	Acute on chronic blood loss anemia  Assessment and plan of treatment:	Patient with mild anemia secondary to acute blood loss with delivery, with history of Iron Def. Anemia, is currently stable. Patient should follow with OB  at regular postpartum check, and to call doctor sooner if develop symptoms of anemia such as shortness of breath, lightheadedness, or fainting. If medication such as ferrous sulfate is prescribed, take as directed.

## 2023-07-25 NOTE — DISCHARGE NOTE OB - NS MD DC FALL RISK RISK
For information on Fall & Injury Prevention, visit: [email or web address] OR  [email or web address] OR  https://www.cdc.gov/steadi/patient.html

## 2023-07-25 NOTE — OB PROVIDER DELIVERY SUMMARY - NSSELHIDDEN_OBGYN_ALL_OB_FT
[NS_DeliveryAttending1_OBGYN_ALL_OB_FT:[id number]=],[NS_DeliveryAssist1_OBGYN_ALL_OB_FT:[id number]=],[NS_DeliveryRN_OBGYN_ALL_OB_FT:[id number]==]

## 2023-07-25 NOTE — DISCHARGE NOTE OB - PATIENT PORTAL LINK FT
You can access the FollowMyHealth Patient Portal offered by [facility] by registering at the following website: [email or web address]. By joining [facility] FollowMyHealth portal, you will also be able to view your health information using other applications (apps) compatible with our system.

## 2023-07-25 NOTE — OB PROVIDER H&P - ASSESSMENT
A/P: Jeffrey is a 33 y.o.  at 37w3d GA admitted to L&D for IOL for IUGR. Cat I FHT.    - consent  - admission labs  - IV hydration  - continuous toco and fetal heart monitoring  - GBS negative  - induction method:   - pain management: Denied epidural     Discussed with     A/P: Jeffrey is a 33 y.o.  at 37w3d GA admitted to L&D for IOL for IUGR. Cat I FHT.    - consent  - admission labs  - IV hydration  - continuous toco and fetal heart monitoring  - GBS negative  - induction method: as per attending  - pain management: Epidural PRN    Discussed with     A/P: Jeffrey is a 33 y.o.  at 37w3d GA admitted to L&D for IOL for IUGR. Cat I FHT.  - consents  - admission labs  - IV hydration  - continuous toco and fetal heart monitoring  - GBS negative  - induction method: as per attending  - pain management: Epidural PRN    Discussed with Dr. Najera    A/P: Jeffrey is a 33 y.o.  at 37w3d GA admitted to L&D for IOL for IUGR, found to be mark breech on bedside sono. Admitted for pCS for fetal malpresentation. Cat I FHT.  Patient is a _ y.o. G P at  weeks  days gestation admitted to L&D for repeat  section/IOL for . Cat ___ FHT.  - consents  - admission labs  - Diet: NPO  - IV hydration  - continuous toco and fetal heart monitoring until surgery  - preop antibiotics 2g ancef  - GBS negative    Discussed with Dr. Najera    A/P: Jeffrey is a 33 y.o.  at 37w3d GA admitted to L&D for IOL for IUGR, found to be mark breech on bedside sono. Admitted for pCS for fetal malpresentation. Cat I FHT.  - consents  - admission labs  - Diet: NPO  - IV hydration  - continuous toco and fetal heart monitoring until surgery  - preop antibiotics 2g ancef  - GBS negative    Discussed with Dr. Najera

## 2023-07-25 NOTE — OB RN INTRAOPERATIVE NOTE - NSSELHIDDEN_OBGYN_ALL_OB_FT
[NS_DeliveryAttending1_OBGYN_ALL_OB_FT:[id number]=],[NS_DeliveryAssist1_OBGYN_ALL_OB_FT:[email or web address]=],[NS_DeliveryRN_OBGYN_ALL_OB_FT:[id number]==]

## 2023-07-25 NOTE — DISCHARGE NOTE OB - HOSPITAL COURSE
She is now a  who presented for a primary  section at 37w3d 2/2 mark breech presentation. Patient had originally presented for IOL 2/2 FGR. She had an uncomplicated surgery and postpartum course. Upon discharge she was passing gas, tolerating PO, ambulating, and voiding spontaneously.

## 2023-07-25 NOTE — OB PROVIDER DELIVERY SUMMARY - NSPROVIDERDELIVERYNOTE_OBGYN_ALL_OB_FT
JORGE ARREOLA is a 33y yo  at 37wk3d taken to the OR for  delivery due to mark breech presentation and IUGR. Upon receiving spinal anesthesia, patient was prepped in sterile fashion.  Viable girl boy delivered in cephalic? position through low transverse uterine hysterotomy. Clear amniotic fluid noted, nuchal cord x1. Baby was assessed by neonatology then provided to parents. Hysterotomy reapproximated with 0-Vicryl, patient received 1 unit of methergine. Interseed was placed over the hysterotomy. Afterwards, excellent hemostasis was obtained. Uterus, fallopian tubes and ovaries noted to be grossly normal. Peritoneum and rectus muscle reapproximated with 0-Monocryl. Fascia closed with 1-Vicryl. Excellent hemostasis obtained. Skin incision closed with InSorb stapler and covered with steristrips and dressing. No intraoperative complications to note.     QBL: 528cc UOP: 125cc    Sex: F, Delivery Time: 1554,  weight: 2640g, APGAR: 8/9 JORGE ARREOLA is a 33y yo  at 37wk3d taken to the OR for  delivery due to mark breech presentation and IUGR. Upon receiving spinal anesthesia, patient was prepped in sterile fashion.  Viable girl boy delivered in mark breech position through low transverse uterine hysterotomy, clear amniotic fluid noted, nuchal cord x1. Baby was assessed by neonatology then provided to parents. Hysterotomy reapproximated with 0-Vicryl, patient received 1 unit of methergine. Interseed was placed over the hysterotomy. Afterwards, excellent hemostasis was obtained. Uterus, fallopian tubes and ovaries noted to be grossly normal. Peritoneum and rectus muscle reapproximated with 0-Monocryl. Fascia closed with 1-Vicryl. Excellent hemostasis obtained. Skin incision closed with InSorb stapler and covered with steristrips and dressing. No intraoperative complications to note.     QBL: 528cc UOP: 125cc    Sex: F, Delivery Time: 1554,  weight: 2640g, APGAR: 8/9 JORGE ARREOLA is a 33y yo  at 37wk3d taken to the OR for  delivery due to mark breech presentation and IUGR. Upon receiving spinal anesthesia, patient was prepped in sterile fashion.  Viable girl boy delivered in mark breech position through low transverse uterine hysterotomy, clear amniotic fluid noted, nuchal cord x1. Baby was assessed by neonatology then provided to parents. Hysterotomy reapproximated with 0-Vicryl, patient received 1 unit of methergine. Interseed was placed over the hysterotomy. Afterwards, excellent hemostasis was obtained. Uterus, fallopian tubes and ovaries noted to be grossly normal. Peritoneum and rectus muscle reapproximated with 0-Monocryl. Fascia closed with 1-Vicryl. Excellent hemostasis obtained. Skin incision closed with InSorb stapler and covered with steristrips and dressing. No intraoperative complications to note.     QBL: 528cc UOP: 125cc    Sex: F, Delivery Time: 1554,  weight: 2640g, APGAR: 8/9  Dictation #[id number]

## 2023-07-25 NOTE — DISCHARGE NOTE OB - CARE PROVIDER_API CALL
Cherie Saldaña  Obstetrics and Gynecology  [address] [facility], [address] [address]  Phone: [phone]  Fax: [phone]  Established Patient  Follow Up Time: 1 week

## 2023-07-25 NOTE — OB PROVIDER H&P - NSHPPHYSICALEXAM_GEN_ALL_CORE
HR: 106 (07-25-23 @ 01:11) (106 - 106)  BP: 118/76 (07-25-23 @ 01:11) (118/76 - 118/76)  Gen: NAD, well-appearing  Abd: soft, gravid  SVE:     FHT:  baseline, +accels, -decels, moderate variability   [place]: Irregular contractions

## 2023-07-25 NOTE — DISCHARGE NOTE OB - PROVIDER TOKENS
This document is complete and the patient is ready for discharge.
PROVIDER:[TOKEN:[[id number]],FOLLOWUP:[1 week],ESTABLISHEDPATIENT:[T]]

## 2023-07-25 NOTE — DISCHARGE NOTE OB - PLAN OF CARE
Please call your provider in 1 week for wound check. Take medications as directed, regular diet, activity as tolerated. Exclusive breast feeding for the first 6 months is recommended. Nothing per vagina for 6 weeks (incl. sex, douching, etc). If you have additional concerns, please inform your provider. Patient with mild anemia secondary to acute blood loss with delivery, with history of Iron Def. Anemia, is currently stable. Patient should follow with OB  at regular postpartum check, and to call doctor sooner if develop symptoms of anemia such as shortness of breath, lightheadedness, or fainting. If medication such as ferrous sulfate is prescribed, take as directed.

## 2023-07-25 NOTE — OB PROVIDER H&P - HISTORY OF PRESENT ILLNESS
JORGE ARREOLA is a 33y  at 37w3d GA who presents to L&D for IOL 2/2 IUGR. LMP 22, [place] 23. Patient was followed by MFM and fetus was found to have an abdominal circumference in the 2%ile on . GBS negative.    Pt denies vaginal bleeding, contractions and leakage of fluid. She endorses good fetal movement.     Pt denies headaches, visual disturbances, RUQ pain, epigastric pain and new-onset edema.     She denies any urinary complaints.     She denies fevers, chills, nausea, vomiting.     She denies shortness of breath, chest pain, and palpitations.    Pregnancy course is significant for IUGR with an AC of 2%ile noted on .     POB: Denies  PGYN: -fibroids/-cysts, denied STD hx, denies abnormal PAPs  PMH: Iron deficiency anemia   PSH: Denies  SH: Denies tobacco use, EtOH use and illicit drug use during the pregnancy; Feels safe at home  Meds: Iron supplement, PNVs  All: NKDA     JORGE ARREOLA is a 33y  at 37w3d GA who presents to L&D for IOL 2/2 IUGR (AC 2nd %ile, umbilical dopplers elevated on 23). GBS negative.    Pt denies vaginal bleeding, contractions, and leakage of fluid. She endorses good fetal movement.     Pt denies headaches, visual disturbances, RUQ pain, epigastric pain and new-onset edema.     She denies any urinary complaints.     She denies fevers, chills, nausea, vomiting.     She denies shortness of breath, chest pain, and palpitations.    Pregnancy course is significant for IUGR with an AC of 2%ile noted on .     POB: Denies  PGYN: -fibroids/-cysts, denied STD hx, denies abnormal PAPs  PMH: Iron deficiency anemia   PSH: Denies  SH: Denies tobacco use, EtOH use and illicit drug use during the pregnancy; Feels safe at home  Meds: Iron supplement, PNVs  All: NKDA     JORGE ARREOLA is a 33y  at 37w3d GA who presents to L&D for IOL 2/2 IUGR (AC 2nd %ile, umbilical dopplers elevated on 23). GBS negative.     Pt denies vaginal bleeding, contractions, and leakage of fluid. She endorses good fetal movement.     Pt denies headaches, visual disturbances, RUQ pain, epigastric pain and new-onset edema.     She denies any urinary complaints.     She denies fevers, chills, nausea, vomiting.     She denies shortness of breath, chest pain, and palpitations.    Pregnancy course is significant for IUGR with an AC of 2%ile noted on .     POB: Denies  PGYN: -fibroids/-cysts, denied STD hx, denies abnormal PAPs  PMH: Iron deficiency anemia   PSH: Denies  SH: Denies tobacco use, EtOH use and illicit drug use during the pregnancy; Feels safe at home  Meds: Iron supplement, PNVs  All: NKDA

## 2023-07-25 NOTE — OB NEONATOLOGY/PEDIATRICIAN DELIVERY SUMMARY - NSPEDSNEONOTESA_OBGYN_ALL_OB_FT
Requested by DR Saldaña to attend a PC/S of a 34y/o  at 37.3 weeks GA secondary to breech presentation and IUGR.  She had + PNC, is blood type O pos, HIV neg, HBsAg neg, RPR NR, Rubella Imm, GBS neg  L&D:  AROM at delivery.  Baby born breech with good cry, transferred to warmer, orally suctioned, dried, and examined. Infant showed to father and then transferred to mother for STS.  A/P:  37 week GA female, breech birth, IUGR  Transition to Regular Nursery under Peds Hospitalist care.  Baby needs a Bio Hip sono at 44-46 weeks GA Requested by DR Saldaña to attend a PC/S of a 34y/o  at 37.3 weeks GA secondary to breech presentation and IUGR.  She had + PNC, is blood type O pos, HIV neg, HBsAg neg, RPR NR, Rubella Imm, GBS neg  L&D:  AROM at delivery.  Baby born breech with good cry, transferred to warmer, orally suctioned, dried, and examined. Infant showed to father and then transferred to mother for STS.  BW: 2640g  A/P:  37 week GA female, breech birth, IUGR  Transition to Regular Nursery under Peds Hospitalist care.  Baby needs a Bio Hip sono at 44-46 weeks GA Requested by DR Saldaña to attend a PC/S of a 34y/o  at 37.3 weeks GA secondary to breech presentation and IUGR.  She had + PNC, is blood type O pos, HIV neg, HBsAg neg, RPR NR, Rubella Imm, GBS neg  L&D:  AROM at delivery.  Baby born breech with good cry, transferred to warmer, orally suctioned, dried, and examined. Infant showed to father.  Baby started to grunt at aprox 6 MOL. CPAP 5 started.  FIO2 adjusted to achieve target O2 sats.  When tried to D/C CPAP baby would desat to 80's. CPAP placed back on.  Infant then transferred to NICU for further evaluation after showing to mother. BW: 2640g  A/P:  37 week GA female, breech birth, IUGR, respiratory distress  Transfer to NICU.  Father updated via [facility]  about baby's condition and plan of care in OR. Requested by DR Saldaña to attend a PC/S of a 34y/o  at 37.3 weeks GA secondary to breech presentation and IUGR.  She had + PNC, is blood type O pos, HIV neg, HBsAg neg, RPR NR, Rubella Imm, GBS neg  L&D:  AROM at delivery.  Baby born breech with good cry, transferred to warmer, orally suctioned, dried, and examined. Infant showed to father.  Baby started to grunt at aprox 6 MOL. CPAP 5 started.  FIO2 adjusted to achieve target O2 sats.  When tried to D/C CPAP baby would desat to 80's. CPAP placed back on.  Infant then transferred to NICU for further evaluation after showing to mother. BW: 2640g  A/P:  37 week GA female, breech birth, IUGR, respiratory distress, extra digit on left hand  Transfer to NICU.  Father updated via [facility]  about baby's condition and plan of care in OR.

## 2023-07-25 NOTE — OB PROVIDER H&P - ATTENDING COMMENTS
patient admitted for delivery due to FGR.    fetal presentation had been vertex at [facility] and was sent for induction, however on admission sono fetus noted to be mark breech.

## 2023-07-25 NOTE — DISCHARGE NOTE OB - MEDICATION SUMMARY - MEDICATIONS TO TAKE
I will START or STAY ON the medications listed below when I get home from the hospital:    ibuprofen 600 mg oral tablet  -- 1 tab(s) by mouth every 6 hours  -- Indication: For pain    acetaminophen 325 mg oral tablet  -- 3 tab(s) by mouth every 6 hours  -- Indication: For pain    Prenatal 1 oral capsule  -- 1 tab(s) by mouth once a day  -- Indication: For postpartum recovery

## 2023-07-26 LAB
BASOPHILS # BLD AUTO: 0.04 K/UL — SIGNIFICANT CHANGE UP (ref 0–0.2)
BASOPHILS NFR BLD AUTO: 0.2 % — SIGNIFICANT CHANGE UP (ref 0–2)
EOSINOPHIL # BLD AUTO: 0 K/UL — SIGNIFICANT CHANGE UP (ref 0–0.5)
EOSINOPHIL NFR BLD AUTO: 0 % — SIGNIFICANT CHANGE UP (ref 0–6)
HCT VFR BLD CALC: 26.5 % — LOW (ref 34.5–45)
HGB BLD-MCNC: 8.9 G/DL — LOW (ref 11.5–15.5)
IMM GRANULOCYTES NFR BLD AUTO: 2.1 % — HIGH (ref 0–0.9)
LYMPHOCYTES # BLD AUTO: 1.06 K/UL — SIGNIFICANT CHANGE UP (ref 1–3.3)
LYMPHOCYTES # BLD AUTO: 4.9 % — LOW (ref 13–44)
MCHC RBC-ENTMCNC: 28.3 PG — SIGNIFICANT CHANGE UP (ref 27–34)
MCHC RBC-ENTMCNC: 33.6 GM/DL — SIGNIFICANT CHANGE UP (ref 32–36)
MCV RBC AUTO: 84.1 FL — SIGNIFICANT CHANGE UP (ref 80–100)
MONOCYTES # BLD AUTO: 1.23 K/UL — HIGH (ref 0–0.9)
MONOCYTES NFR BLD AUTO: 5.7 % — SIGNIFICANT CHANGE UP (ref 2–14)
NEUTROPHILS # BLD AUTO: 18.74 K/UL — HIGH (ref 1.8–7.4)
NEUTROPHILS NFR BLD AUTO: 87.1 % — HIGH (ref 43–77)
PLATELET # BLD AUTO: 301 K/UL — SIGNIFICANT CHANGE UP (ref 150–400)
RBC # BLD: 3.15 M/UL — LOW (ref 3.8–5.2)
RBC # FLD: 13 % — SIGNIFICANT CHANGE UP (ref 10.3–14.5)
WBC # BLD: 21.52 K/UL — HIGH (ref 3.8–10.5)
WBC # FLD AUTO: 21.52 K/UL — HIGH (ref 3.8–10.5)

## 2023-07-26 RX ORDER — IBUPROFEN 200 MG
600 TABLET ORAL EVERY 6 HOURS
Refills: 0 | Status: DISCONTINUED | OUTPATIENT
Start: 2023-07-26 | End: 2023-07-28

## 2023-07-26 RX ORDER — ACETAMINOPHEN 500 MG
3 TABLET ORAL
Qty: 0 | Refills: 0 | DISCHARGE
Start: 2023-07-26

## 2023-07-26 RX ORDER — IBUPROFEN 200 MG
1 TABLET ORAL
Qty: 0 | Refills: 0 | DISCHARGE
Start: 2023-07-26

## 2023-07-26 RX ADMIN — Medication 30 MILLIGRAM(S): at 11:32

## 2023-07-26 RX ADMIN — Medication 30 MILLIGRAM(S): at 05:09

## 2023-07-26 RX ADMIN — Medication 975 MILLIGRAM(S): at 08:47

## 2023-07-26 RX ADMIN — SCOPALAMINE 1 PATCH: 1 PATCH, EXTENDED RELEASE TRANSDERMAL at 07:55

## 2023-07-26 RX ADMIN — Medication 975 MILLIGRAM(S): at 21:41

## 2023-07-26 RX ADMIN — ENOXAPARIN SODIUM 40 MILLIGRAM(S): 100 INJECTION SUBCUTANEOUS at 05:51

## 2023-07-26 RX ADMIN — Medication 975 MILLIGRAM(S): at 02:08

## 2023-07-26 RX ADMIN — Medication 975 MILLIGRAM(S): at 15:01

## 2023-07-26 RX ADMIN — Medication 600 MILLIGRAM(S): at 18:19

## 2023-07-26 NOTE — PROGRESS NOTE ADULT - ASSESSMENT
ASSESSMENT:  JORGE ARREOLA is a 33y yo  POD1 s/p uncomplicated pCS  due to mark breech presentation, FGR (AC 2nd %ile). Patient received 1 dose of Methergine.  Patient has no complaints at this time, is otherwise clinically and hemodynamically stable.   girl is in NICU due to respiratory distress.    #CS Postpartum   - Continue routine post-operative and post-partum care  - Hgb 10.9 > AM CBC pending  - Rub: I/I  - TOV successful  - Continue with current pain management  - Encouraged maternal- bonding  - Encouraged ambulation and use of incentive spirometer    - Diet: Regular, advance as tolerated  - DVT ppx: SCDs and subQ Lovenox  - Dispo: Home POD2 per Attending's approval        Home

## 2023-07-26 NOTE — PROGRESS NOTE ADULT - SUBJECTIVE AND OBJECTIVE BOX
SUBJECTIVE:  Patient seen and examined this morning at bedside. No acute events overnight. Reports feeling well this morning. Pain is well controlled with PRN pain medication. No lightheadedness or weakness at this time.  Patient is able to ambulate, is voiding spontaneously, but denies flatus or BM at this time. Denies fevers, chills, shortness of breath, headaches, chest pain, vision changes or calf pain.    Patient interviewed with [place] [employer]  Lisa #[id number]    OBJECTIVE:  Physical exam:  General: AOx3, NAD.  Heart: S1/S2 with regular rate and normal rhythm.  Lungs: CTABL, no crackles or wheezing.   Abdomen: Soft, appropriately tender, nondistended, no guarding or rebound tenderness, firm uterine fundus at umbilicus  Incision: clean dry and intact with steristrips and InSorb staples. No erythema or discharge.  Ext: BL LE reveal minimal swelling, no popliteal tenderness or skin changes.     Vital Signs Last 24 Hrs  T(C): 36.6 (26 Jul 2023 05:45), Max: 36.8 (25 Jul 2023 20:19)  T(F): 97.9 (26 Jul 2023 05:45), Max: 98.2 (25 Jul 2023 20:19)  HR: 99 (26 Jul 2023 05:45) (79 - 109)  BP: 120/73 (26 Jul 2023 05:45) (88/53 - 120/73)      07-24-23 @ 07:01  -  07-25-23 @ 07:00  --------------------------------------------------------  IN: 1000 mL / OUT: 0 mL / NET: 1000 mL    07-25-23 @ 07:01  -  07-26-23 @ 06:05  --------------------------------------------------------  IN: 4575 mL / OUT: 1313 mL / NET: 3262 mL    LABS:                        10.9   12.97 )-----------( 326      ( 25 Jul 2023 01:20 )             32.0

## 2023-07-26 NOTE — PROGRESS NOTE ADULT - SUBJECTIVE AND OBJECTIVE BOX
INTERVAL HPI/OVERNIGHT EVENTS:  33y Female s/p c section under spinal anesthesia with duramorph for post op analgesia on 07/25/23    Vital Signs Last 24 Hrs  T(C): 36.6 (26 Jul 2023 05:45), Max: 36.8 (25 Jul 2023 20:19)  T(F): 97.9 (26 Jul 2023 05:45), Max: 98.2 (25 Jul 2023 20:19)  HR: 99 (26 Jul 2023 05:45) (79 - 109)  BP: 120/73 (26 Jul 2023 05:45) (88/53 - 120/73)  BP(mean): --  RR: 16 (26 Jul 2023 05:45) (15 - 16)  SpO2: 98% (26 Jul 2023 05:45) (93% - 100%)    Parameters below as of 26 Jul 2023 05:45  Patient On (Oxygen Delivery Method): room air            Patient's overall anesthesia satisfaction: Positive    Patients pain is well controlled with IT duramorph    No respiratory events overnight    No pruritis at this time    Patient doing well     No headache      No residual numbness or weakness, sensory and motor function intact.    No anesthetic complications or complaints noted or reported          .

## 2023-07-27 ENCOUNTER — APPOINTMENT (OUTPATIENT)
Dept: OBGYN | Facility: CLINIC | Age: 33
End: 2023-07-27

## 2023-07-27 ENCOUNTER — APPOINTMENT (OUTPATIENT)
Dept: ANTEPARTUM | Facility: CLINIC | Age: 33
End: 2023-07-27

## 2023-07-27 DIAGNOSIS — D62 ACUTE POSTHEMORRHAGIC ANEMIA: ICD-10-CM

## 2023-07-27 RX ORDER — ACETAMINOPHEN 500 MG
3 TABLET ORAL
Qty: 0 | Refills: 0
Start: 2023-07-27

## 2023-07-27 RX ORDER — IBUPROFEN 200 MG
1 TABLET ORAL
Refills: 0
Start: 2023-07-27

## 2023-07-27 RX ADMIN — Medication 975 MILLIGRAM(S): at 03:19

## 2023-07-27 RX ADMIN — Medication 975 MILLIGRAM(S): at 08:38

## 2023-07-27 RX ADMIN — Medication 975 MILLIGRAM(S): at 14:52

## 2023-07-27 RX ADMIN — Medication 600 MILLIGRAM(S): at 05:34

## 2023-07-27 RX ADMIN — ENOXAPARIN SODIUM 40 MILLIGRAM(S): 100 INJECTION SUBCUTANEOUS at 05:34

## 2023-07-27 RX ADMIN — Medication 600 MILLIGRAM(S): at 11:17

## 2023-07-27 RX ADMIN — Medication 975 MILLIGRAM(S): at 20:39

## 2023-07-27 RX ADMIN — Medication 600 MILLIGRAM(S): at 00:02

## 2023-07-27 RX ADMIN — Medication 600 MILLIGRAM(S): at 17:10

## 2023-07-27 NOTE — PROGRESS NOTE ADULT - ASSESSMENT
ASSESSMENT:  JORGE ARREOLA is a 33y yo  POD1 s/p uncomplicated pCS  due to mark breech presentation, FGR (AC 2nd %ile). Patient received 1 dose of Methergine. Patient has no complaints at this time, is otherwise clinically and hemodynamically stable. Feels ready to go home but wants to pass flatus/have bm before leaving.   girl is now at bedside.    #CS Postpartum   - Continue routine post-operative and post-partum care  - Hgb 10.9 > 8.9, asymptomatic  - VSS with one isolated episode of tachy to 103bpm overnight  - Rub: I/I  - TOV successful  - Continue with current pain management  - Encouraged maternal- bonding  - Encouraged ambulation and use of incentive spirometer    - Diet: Regular, advance as tolerated  - DVT ppx: SCDs and subQ Lovenox  - Dispo: Home today per Attending's approval

## 2023-07-27 NOTE — PROGRESS NOTE ADULT - SUBJECTIVE AND OBJECTIVE BOX
SUBJECTIVE:  Patient seen and examined this morning at bedside. No acute events overnight. Reports feeling well this morning. Pain is well controlled with PRN pain medication. No lightheadedness or weakness at this time. Patient is able to ambulate, is voiding spontaneously, but denies flatus or BM at this time. Denies fevers, chills, shortness of breath, headaches, chest pain, vision changes or calf pain.    Patient interviewed with [facility]  Leonor #[id number]    OBJECTIVE:  Physical exam:  General: AOx3, NAD.  Heart: S1/S2 with regular rate and normal rhythm.  Lungs: CTABL, no crackles or wheezing.   Abdomen: Soft, appropriately tender, nondistended, no guarding or rebound tenderness, firm uterine fundus at umbilicus  Incision: clean dry and intact with steristrips and InSorb staples. No erythema or discharge.  Ext: BL LE reveal minimal swelling, no popliteal tenderness or skin changes.     Vital Signs Last 24 Hrs  T(C): 37.1 (27 Jul 2023 03:50), Max: 37.1 (26 Jul 2023 08:00)  T(F): 98.8 (27 Jul 2023 03:50), Max: 98.8 (27 Jul 2023 03:50)  HR: 87 (27 Jul 2023 03:50) (87 - 103)  BP: 105/70 (27 Jul 2023 03:50) (96/60 - 105/70)      07-24-23 @ 07:01  -  07-25-23 @ 07:00  --------------------------------------------------------  IN: 1000 mL / OUT: 0 mL / NET: 1000 mL    07-25-23 @ 07:01  -  07-26-23 @ 06:05  --------------------------------------------------------  IN: 4575 mL / OUT: 1313 mL / NET: 3262 mL    LABS:                        10.9   12.97 )-----------( 326      ( 25 Jul 2023 01:20 )             32.0

## 2023-07-28 VITALS
DIASTOLIC BLOOD PRESSURE: 78 MMHG | RESPIRATION RATE: 16 BRPM | TEMPERATURE: 98 F | SYSTOLIC BLOOD PRESSURE: 113 MMHG | OXYGEN SATURATION: 99 % | HEART RATE: 92 BPM

## 2023-07-28 PROBLEM — Z78.9 OTHER SPECIFIED HEALTH STATUS: Chronic | Status: ACTIVE | Noted: 2023-07-25

## 2023-07-28 PROCEDURE — 59050 FETAL MONITOR W/REPORT: CPT

## 2023-07-28 PROCEDURE — 86900 BLOOD TYPING SEROLOGIC ABO: CPT

## 2023-07-28 PROCEDURE — C1889: CPT

## 2023-07-28 PROCEDURE — 88307 TISSUE EXAM BY PATHOLOGIST: CPT

## 2023-07-28 PROCEDURE — 86901 BLOOD TYPING SEROLOGIC RH(D): CPT

## 2023-07-28 PROCEDURE — 86850 RBC ANTIBODY SCREEN: CPT

## 2023-07-28 PROCEDURE — 86780 TREPONEMA PALLIDUM: CPT

## 2023-07-28 PROCEDURE — 36415 COLL VENOUS BLD VENIPUNCTURE: CPT

## 2023-07-28 PROCEDURE — 85025 COMPLETE CBC W/AUTO DIFF WBC: CPT

## 2023-07-28 RX ORDER — IBUPROFEN 200 MG
1 TABLET ORAL
Qty: 56 | Refills: 0
Start: 2023-07-28 | End: 2023-08-10

## 2023-07-28 RX ORDER — ACETAMINOPHEN 500 MG
3 TABLET ORAL
Qty: 168 | Refills: 0
Start: 2023-07-28 | End: 2023-08-10

## 2023-07-28 RX ADMIN — Medication 600 MILLIGRAM(S): at 05:48

## 2023-07-28 RX ADMIN — Medication 975 MILLIGRAM(S): at 09:03

## 2023-07-28 RX ADMIN — ENOXAPARIN SODIUM 40 MILLIGRAM(S): 100 INJECTION SUBCUTANEOUS at 06:06

## 2023-07-28 NOTE — PROGRESS NOTE ADULT - PROBLEM SELECTOR PROBLEM 3
Uterine atony, postpartum, current hospitalization
Uterine atony, postpartum, current hospitalization

## 2023-07-28 NOTE — PROGRESS NOTE ADULT - ATTENDING COMMENTS
32yo  now stable POD#2 s/p primary  section for breech, asymptomatic from acute anemia, will stay as baby is just released from NICU.
POD#3 from PCS cleared for dc home  Stefanie Olvera

## 2023-07-28 NOTE — PROGRESS NOTE ADULT - ASSESSMENT
ASSESSMENT:  JORGE ARREOLA is a 33y yo  POD3 s/p uncomplicated pCS due to amrk breech presentation, FGR (AC 2nd %ile). Patient received IM Methergine x1. Patient has no complaints at this time, is otherwise clinically and hemodynamically stable.    girl is now at bedside.    #CS Postpartum   - Continue routine post-operative and post-partum care  - VSS   - Rub: I/I  - TOV successful  - Continue with current pain management  - Encouraged maternal- bonding  - Encouraged ambulation and use of incentive spirometer    #Acute Blood Loss Anemia  - Stable  - Hgb 10.9 > 8.9, asymptomatic    - Diet: Regular, advance as tolerated  - DVT ppx: SCDs and subQ Lovenox  - Dispo: Home today per Attending's approval        ASSESSMENT:  JORGE ARREOLA is a 33y yo  POD3 s/p uncomplicated pCS due to mark breech presentation, FGR (AC 2nd %ile). Patient received IM Methergine x1. Patient has no complaints at this time, is otherwise clinically and hemodynamically stable.    girl is now at bedside. Patient feels ready to go home today.    #CS Postpartum   - Continue routine post-operative and post-partum care  - VSS   - Rub: I/I  - TOV successful  - Continue with current pain management  - Encouraged maternal- bonding  - Encouraged ambulation and use of incentive spirometer    #Acute Blood Loss Anemia  - Stable  - Hgb 10.9 > 8.9, asymptomatic    - Diet: Regular, advance as tolerated  - DVT ppx: SCDs and subQ Lovenox  - Dispo: Home today per Attending's approval

## 2023-07-28 NOTE — PROGRESS NOTE ADULT - SUBJECTIVE AND OBJECTIVE BOX
SUBJECTIVE:  Patient seen and examined this morning at bedside. No acute events overnight. Reports feeling well this morning. Pain is well controlled with PRN pain medication. No lightheadedness or weakness at this time. Patient is able to ambulate, is voiding spontaneously, but denies flatus or BM at this time. Denies fevers, chills, shortness of breath, headaches, chest pain, vision changes or calf pain.    Patient interviewed with [place] [employer]      OBJECTIVE:  Physical exam:  General: AOx3, NAD.  Heart: S1/S2 with regular rate and normal rhythm.  Lungs: CTABL, no crackles or wheezing.   Abdomen: Soft, appropriately tender, nondistended, no guarding or rebound tenderness, firm uterine fundus at umbilicus  Incision: clean dry and intact with steristrips and InSorb staples. No erythema or discharge.  Ext: BL LE reveal minimal swelling, no popliteal tenderness or skin changes.     Vital Signs Last 24 Hrs  T(C): 36.9 (27 Jul 2023 15:27), Max: 36.9 (27 Jul 2023 15:27)  T(F): 98.4 (27 Jul 2023 15:27), Max: 98.4 (27 Jul 2023 15:27)  HR: 94 (27 Jul 2023 15:27) (94 - 94)  BP: 106/73 (27 Jul 2023 15:27) (106/73 - 106/73)                       8.9    21.52 )-----------( 301      ( 26 Jul 2023 06:26 )             26.5           07-24-23 @ 07:01  -  07-25-23 @ 07:00  --------------------------------------------------------  IN: 1000 mL / OUT: 0 mL / NET: 1000 mL    07-25-23 @ 07:01  -  07-26-23 @ 06:05  --------------------------------------------------------  IN: 4575 mL / OUT: 1313 mL / NET: 3262 mL    LABS:                        10.9   12.97 )-----------( 326      ( 25 Jul 2023 01:20 )             32.0            SUBJECTIVE:  Patient seen and examined this morning at bedside. No acute events overnight. Reports feeling well this morning. Pain is well controlled with PRN pain medication. No lightheadedness or weakness at this time. Patient is able to ambulate, is voiding spontaneously, and had flatus and BM last night. Denies fevers, chills, shortness of breath, headaches, chest pain, vision changes or calf pain.      OBJECTIVE:  Physical exam:  General: AOx3, NAD.  Heart: S1/S2 with regular rate and normal rhythm.  Lungs: CTABL, no crackles or wheezing.   Abdomen: Soft, appropriately tender, nondistended, no guarding or rebound tenderness, firm uterine fundus at umbilicus  Incision: clean dry and intact with steristrips and InSorb staples. No erythema or discharge.  Ext: BL LE reveal minimal swelling, no popliteal tenderness or skin changes.     Vital Signs Last 24 Hrs  T(C): 36.9 (27 Jul 2023 15:27), Max: 36.9 (27 Jul 2023 15:27)  T(F): 98.4 (27 Jul 2023 15:27), Max: 98.4 (27 Jul 2023 15:27)  HR: 94 (27 Jul 2023 15:27) (94 - 94)  BP: 106/73 (27 Jul 2023 15:27) (106/73 - 106/73)                       8.9    21.52 )-----------( 301      ( 26 Jul 2023 06:26 )             26.5           07-24-23 @ 07:01  -  07-25-23 @ 07:00  --------------------------------------------------------  IN: 1000 mL / OUT: 0 mL / NET: 1000 mL    07-25-23 @ 07:01  -  07-26-23 @ 06:05  --------------------------------------------------------  IN: 4575 mL / OUT: 1313 mL / NET: 3262 mL    LABS:                        10.9   12.97 )-----------( 326      ( 25 Jul 2023 01:20 )             32.0

## 2023-07-31 ENCOUNTER — APPOINTMENT (OUTPATIENT)
Dept: ANTEPARTUM | Facility: CLINIC | Age: 33
End: 2023-07-31

## 2023-07-31 ENCOUNTER — NON-APPOINTMENT (OUTPATIENT)
Age: 33
End: 2023-07-31

## 2023-08-03 ENCOUNTER — APPOINTMENT (OUTPATIENT)
Dept: ANTEPARTUM | Facility: CLINIC | Age: 33
End: 2023-08-03

## 2023-08-03 ENCOUNTER — APPOINTMENT (OUTPATIENT)
Dept: OBGYN | Facility: CLINIC | Age: 33
End: 2023-08-03
Payer: MEDICAID

## 2023-08-03 VITALS
HEIGHT: 65 IN | BODY MASS INDEX: 22.99 KG/M2 | SYSTOLIC BLOOD PRESSURE: 110 MMHG | WEIGHT: 138 LBS | DIASTOLIC BLOOD PRESSURE: 70 MMHG

## 2023-08-03 PROCEDURE — 0503F POSTPARTUM CARE VISIT: CPT

## 2023-08-03 NOTE — PHYSICAL EXAM
[Chaperone Present] : A chaperone was present in the examining room during all aspects of the physical examination [Appropriately responsive] : appropriately responsive [Alert] : alert [No Acute Distress] : no acute distress [Soft] : soft [Non-tender] : non-tender [Non-distended] : non-distended [No HSM] : No HSM [No Lesions] : no lesions [No Mass] : no mass [Oriented x3] : oriented x3 [FreeTextEntry7] : incision c/d/i

## 2023-08-03 NOTE — REASON FOR VISIT
[Follow-Up] : a follow-up evaluation of [FreeTextEntry2] : INCISION CHECK (P  ON 23, FEMALE, BREAST FEEDING

## 2023-08-03 NOTE — HISTORY OF PRESENT ILLNESS
[N] : Patient is not sexually active [Y] : Positive pregnancy history [Menarche Age: ____] : age at menarche was [unfilled] [TextBox_4] : postop csection on 7/25/23 Lizandro Ballesteros [id number] No complaints.  [HIVDate] : 07/20/23 [TextBox_53] : NEG [SyphilisDate] : 07/20/23 [TextBox_58] : NEG [GonorrheaDate] : 05/13/23 [TextBox_63] : NEG [ChlamydiaDate] : 05/13/23 [TextBox_68] : NEG [PGxTotal] : 1 [[facility] HospitalxFulerm] : 1 [[facility] Center[facility]] : 1

## 2023-08-04 ENCOUNTER — APPOINTMENT (OUTPATIENT)
Age: 33
End: 2023-08-04

## 2023-08-06 DIAGNOSIS — N97.9 FEMALE INFERTILITY, UNSPECIFIED: ICD-10-CM

## 2023-08-07 ENCOUNTER — APPOINTMENT (OUTPATIENT)
Dept: ANTEPARTUM | Facility: CLINIC | Age: 33
End: 2023-08-07

## 2023-08-10 ENCOUNTER — APPOINTMENT (OUTPATIENT)
Dept: ANTEPARTUM | Facility: CLINIC | Age: 33
End: 2023-08-10

## 2023-08-30 ENCOUNTER — APPOINTMENT (OUTPATIENT)
Dept: OBGYN | Facility: CLINIC | Age: 33
End: 2023-08-30
Payer: MEDICAID

## 2023-08-30 VITALS
DIASTOLIC BLOOD PRESSURE: 70 MMHG | BODY MASS INDEX: 22.66 KG/M2 | HEIGHT: 65 IN | SYSTOLIC BLOOD PRESSURE: 110 MMHG | WEIGHT: 136 LBS

## 2023-08-30 PROCEDURE — 81025 URINE PREGNANCY TEST: CPT

## 2023-08-30 PROCEDURE — 0503F POSTPARTUM CARE VISIT: CPT

## 2023-08-30 NOTE — HISTORY OF PRESENT ILLNESS
[Delivery Date: ___] : on [unfilled] [Primary C/S] : delivered by  section [Female] : Delivery History: baby girl [Breastfeeding] : currently nursing

## 2023-08-31 LAB
HCG UR QL: NEGATIVE
QUALITY CONTROL: YES

## 2023-09-26 ENCOUNTER — NON-APPOINTMENT (OUTPATIENT)
Age: 33
End: 2023-09-26

## 2023-10-03 LAB — SURGICAL PATHOLOGY STUDY: SIGNIFICANT CHANGE UP

## 2023-11-09 ENCOUNTER — APPOINTMENT (OUTPATIENT)
Dept: OBGYN | Facility: CLINIC | Age: 33
End: 2023-11-09